# Patient Record
Sex: FEMALE | Race: BLACK OR AFRICAN AMERICAN | NOT HISPANIC OR LATINO | URBAN - METROPOLITAN AREA
[De-identification: names, ages, dates, MRNs, and addresses within clinical notes are randomized per-mention and may not be internally consistent; named-entity substitution may affect disease eponyms.]

---

## 2021-02-22 ENCOUNTER — OFFICE VISIT (OUTPATIENT)
Dept: URBAN - METROPOLITAN AREA CLINIC 96 | Facility: CLINIC | Age: 51
End: 2021-02-22

## 2021-07-09 ENCOUNTER — OFFICE VISIT (OUTPATIENT)
Dept: URBAN - METROPOLITAN AREA CLINIC 40 | Facility: CLINIC | Age: 51
End: 2021-07-09
Payer: COMMERCIAL

## 2021-07-09 ENCOUNTER — WEB ENCOUNTER (OUTPATIENT)
Dept: URBAN - METROPOLITAN AREA CLINIC 40 | Facility: CLINIC | Age: 51
End: 2021-07-09

## 2021-07-09 VITALS
SYSTOLIC BLOOD PRESSURE: 142 MMHG | TEMPERATURE: 95 F | BODY MASS INDEX: 24.66 KG/M2 | HEIGHT: 65 IN | WEIGHT: 148 LBS | DIASTOLIC BLOOD PRESSURE: 82 MMHG | HEART RATE: 90 BPM

## 2021-07-09 DIAGNOSIS — K29.70 GASTRITIS: ICD-10-CM

## 2021-07-09 DIAGNOSIS — R14.0 BLOATING: ICD-10-CM

## 2021-07-09 DIAGNOSIS — Z87.11 HISTORY OF PEPTIC ULCER DISEASE: ICD-10-CM

## 2021-07-09 DIAGNOSIS — M94.0 COSTOCHONDRITIS: ICD-10-CM

## 2021-07-09 DIAGNOSIS — K59.01 CONSTIPATION: ICD-10-CM

## 2021-07-09 DIAGNOSIS — R10.13 MIDEPIGASTRIC PAIN: ICD-10-CM

## 2021-07-09 PROCEDURE — 99214 OFFICE O/P EST MOD 30 MIN: CPT | Performed by: INTERNAL MEDICINE

## 2021-07-09 RX ORDER — SUCRALFATE 1 G/1
1 TABLET ON AN EMPTY STOMACH TABLET ORAL TWICE A DAY
Qty: 60 | Refills: 1 | OUTPATIENT
Start: 2021-07-09 | End: 2021-09-07

## 2021-07-09 RX ORDER — BISACODYL 5 MG/1
1 TABLET AS NEEDED TABLET, COATED ORAL ONCE A DAY
Status: DISCONTINUED | COMMUNITY

## 2021-07-09 RX ORDER — PRIMIDONE 250 MG/1
1 TABLET TABLET ORAL TWICE A DAY
Status: DISCONTINUED | COMMUNITY

## 2021-07-09 RX ORDER — LOSARTAN POTASSIUM 25 MG/1
TABLET, FILM COATED ORAL
Qty: 0 | Refills: 0 | Status: DISCONTINUED | COMMUNITY
Start: 1900-01-01

## 2021-07-09 RX ORDER — GALCANEZUMAB 120 MG/ML
AS DIRECTED INJECTION, SOLUTION SUBCUTANEOUS
Status: ACTIVE | COMMUNITY

## 2021-07-09 RX ORDER — VALACYCLOVIR 500 MG/1
1 TABLET TABLET, FILM COATED ORAL ONCE A DAY
Status: DISCONTINUED | COMMUNITY

## 2021-07-09 RX ORDER — PANTOPRAZOLE SODIUM 40 MG/1
1 TABLET TABLET, DELAYED RELEASE ORAL ONCE A DAY
Qty: 90 | Refills: 1 | OUTPATIENT
Start: 2021-07-09

## 2021-07-09 RX ORDER — LAMOTRIGINE 100 MG/1
1 TABLET TABLET ORAL ONCE A DAY
Status: ACTIVE | COMMUNITY

## 2021-07-09 NOTE — HPI-TODAY'S VISIT:
Ms. Graf is a 50 year old Black female who presents to [facility] office today with abdominal pain. She has been followed by Dr. Lema previously. She had an EGD by Dr. Lema in 2019 where she was noted to have gastritis, a small nonbleeding superficial gastric ulcer.  Normal duodenum.  She had a colonoscopy in February 21, 2018 with a small polyp removed from sigmoid colon.  There was some moderate inflammation and ulcerated mucosa in the cecum and ascending colon which was biopsied.  The remaining colon and terminal ileum were normal.  Per path, sigmoid colon polyp was hyperplastic.  Biopsies from his terminal ileum were normal.  Cecal biopsies with prominent lymphoid aggregates no evidence of chronic, active colitis.  No collagenous or lymphocytic colitis.  Ascending colon polyps with rare with reflux crypts but no evidence of active or chronic colitis.  Transverse colon biopsies were unremarkable as well as biopsies from the descending colon.  It was recommended that she have another colonoscopy in 5 years time.  She is coming to the office today for management of bloating, abdominal pain after steroids for costochondritis. Complains of midepigastric abdominal pain, nonradiating occasional right-sided discomfort.  Has had nausea as well as constipation.  Does not believe having a bowel movement completely alleviates the abdominal pain.  Also, she admits that she was recently diagnosed with costochondritis and given a trial of steroids as well as a brief trial of NSAIDs which she completed.  Onset of abdominal pain in the last 2 weeks.  She is a  and is concerned with travel early next week.  Denies other use of NSAIDs prior to onset of sudden epigastric pain.  Denies change in her diet until recently when she had been doing low carbohydrate diet.  No stressors otherwise identified.  Has had no significant alcohol prior to onset of event.  Not smoking.  Denies any other precipitating factors.  Not currently on any PPI therapy but admits she did take an OTC Nexium on yesterday.  Also used a suppository and tried to use an enema and got very small caliber stool without any rectal bleeding or melena reported.

## 2021-07-09 NOTE — PHYSICAL EXAM GASTROINTESTINAL
Abdomen , soft, mild TTP in MEGD region, nondistended , no guarding or rigidity , no masses palpable , normal bowel sounds , Liver and Spleen , no hepatomegaly present , no hepatosplenomegaly , liver nontender , spleen not palpable

## 2021-07-12 ENCOUNTER — OFFICE VISIT (OUTPATIENT)
Dept: URBAN - METROPOLITAN AREA SURGERY CENTER 30 | Facility: SURGERY CENTER | Age: 51
End: 2021-07-12

## 2021-07-12 PROBLEM — 4556007 GASTRITIS: Status: ACTIVE | Noted: 2021-07-09

## 2021-07-12 PROBLEM — 14760008 CONSTIPATION: Status: ACTIVE | Noted: 2021-07-12

## 2021-07-13 ENCOUNTER — OFFICE VISIT (OUTPATIENT)
Dept: URBAN - METROPOLITAN AREA CLINIC 92 | Facility: CLINIC | Age: 51
End: 2021-07-13

## 2021-08-04 ENCOUNTER — OFFICE VISIT (OUTPATIENT)
Dept: URBAN - METROPOLITAN AREA SURGERY CENTER 30 | Facility: SURGERY CENTER | Age: 51
End: 2021-08-04

## 2021-08-10 ENCOUNTER — OFFICE VISIT (OUTPATIENT)
Dept: URBAN - METROPOLITAN AREA CLINIC 40 | Facility: CLINIC | Age: 51
End: 2021-08-10

## 2021-10-06 ENCOUNTER — TELEPHONE ENCOUNTER (OUTPATIENT)
Dept: URBAN - METROPOLITAN AREA CLINIC 40 | Facility: CLINIC | Age: 51
End: 2021-10-06

## 2022-01-13 ENCOUNTER — OFFICE VISIT (OUTPATIENT)
Dept: URBAN - METROPOLITAN AREA CLINIC 109 | Facility: CLINIC | Age: 52
End: 2022-01-13

## 2022-01-14 ENCOUNTER — OFFICE VISIT (OUTPATIENT)
Dept: URBAN - METROPOLITAN AREA CLINIC 74 | Facility: CLINIC | Age: 52
End: 2022-01-14

## 2022-03-10 ENCOUNTER — OFFICE VISIT (OUTPATIENT)
Dept: URBAN - METROPOLITAN AREA CLINIC 92 | Facility: CLINIC | Age: 52
End: 2022-03-10

## 2022-03-31 ENCOUNTER — OFFICE VISIT (OUTPATIENT)
Dept: URBAN - METROPOLITAN AREA CLINIC 92 | Facility: CLINIC | Age: 52
End: 2022-03-31

## 2022-05-09 ENCOUNTER — OFFICE VISIT (OUTPATIENT)
Dept: URBAN - METROPOLITAN AREA CLINIC 124 | Facility: CLINIC | Age: 52
End: 2022-05-09

## 2023-03-02 ENCOUNTER — LAB OUTSIDE AN ENCOUNTER (OUTPATIENT)
Dept: URBAN - METROPOLITAN AREA CLINIC 92 | Facility: CLINIC | Age: 53
End: 2023-03-02

## 2023-03-02 ENCOUNTER — OFFICE VISIT (OUTPATIENT)
Dept: URBAN - METROPOLITAN AREA CLINIC 92 | Facility: CLINIC | Age: 53
End: 2023-03-02
Payer: COMMERCIAL

## 2023-03-02 ENCOUNTER — DASHBOARD ENCOUNTERS (OUTPATIENT)
Age: 53
End: 2023-03-02

## 2023-03-02 VITALS
SYSTOLIC BLOOD PRESSURE: 122 MMHG | WEIGHT: 145 LBS | HEART RATE: 80 BPM | HEIGHT: 65 IN | TEMPERATURE: 97.8 F | BODY MASS INDEX: 24.16 KG/M2 | DIASTOLIC BLOOD PRESSURE: 78 MMHG

## 2023-03-02 DIAGNOSIS — K59.04 CHRONIC IDIOPATHIC CONSTIPATION: ICD-10-CM

## 2023-03-02 DIAGNOSIS — Z87.11 HISTORY OF GASTRIC ULCER: ICD-10-CM

## 2023-03-02 DIAGNOSIS — Z86.010 PERSONAL HISTORY OF COLONIC POLYPS: ICD-10-CM

## 2023-03-02 DIAGNOSIS — G40.909 NONINTRACTABLE EPILEPSY WITHOUT STATUS EPILEPTICUS, UNSPECIFIED EPILEPSY TYPE: ICD-10-CM

## 2023-03-02 PROBLEM — 82934008: Status: ACTIVE | Noted: 2023-03-02

## 2023-03-02 PROBLEM — 422513000: Status: ACTIVE | Noted: 2023-03-02

## 2023-03-02 PROBLEM — 428283002: Status: ACTIVE | Noted: 2023-03-02

## 2023-03-02 PROCEDURE — 99204 OFFICE O/P NEW MOD 45 MIN: CPT | Performed by: INTERNAL MEDICINE

## 2023-03-02 RX ORDER — LAMOTRIGINE 100 MG/1
1 TABLET TABLET ORAL ONCE A DAY
Status: ACTIVE | COMMUNITY

## 2023-03-02 RX ORDER — PANTOPRAZOLE SODIUM 40 MG/1
1 TABLET TABLET, DELAYED RELEASE ORAL ONCE A DAY
Qty: 90 | Refills: 1 | Status: DISCONTINUED | COMMUNITY
Start: 2021-07-09

## 2023-03-02 RX ORDER — ONDANSETRON HYDROCHLORIDE 4 MG/1
1 TABLET AS NEEDED TABLET, FILM COATED ORAL ONCE A DAY
Qty: 2 | Refills: 0 | OUTPATIENT
Start: 2023-03-02

## 2023-03-02 RX ORDER — GALCANEZUMAB 120 MG/ML
AS DIRECTED INJECTION, SOLUTION SUBCUTANEOUS
Status: ACTIVE | COMMUNITY

## 2023-03-02 RX ORDER — LINACLOTIDE 145 UG/1
1 CAPSULE AT LEAST 30 MINUTES BEFORE THE FIRST MEAL OF THE DAY ON AN EMPTY STOMACH CAPSULE, GELATIN COATED ORAL ONCE A DAY
Qty: 90 | Refills: 3 | OUTPATIENT
Start: 2023-03-02 | End: 2024-02-25

## 2023-03-02 RX ORDER — POLYETHYLENE GLYCOL 3350, SODIUM CHLORIDE, SODIUM BICARBONATE, POTASSIUM CHLORIDE 420; 11.2; 5.72; 1.48 G/4L; G/4L; G/4L; G/4L
AS DIRECTED POWDER, FOR SOLUTION ORAL AS DIRECTED
Qty: 4 LITER | Refills: 0 | OUTPATIENT
Start: 2023-03-02 | End: 2023-03-03

## 2023-03-02 NOTE — HPI-TODAY'S VISIT:
This is a 52-year-old female referred for GI consultation and a copy will be sent to the referring provider Dr. Reinaldo Rocha.  I last saw the patient for abdominal pain and did an EGD on July 9, 2019 as she had complained of some black stools several weeks prior to seeing me.  She was taking some ibuprofen and had a history of erosive gastritis in 2018.  EGD showed 1 superficial gastric ulcer with no stigmata of bleeding in the gastric antrum.  There was some gastritis as well biopsies were sent.  Pathology showed no evidence of celiac, reactive gastropathy with intestinal metaplasia of the antrum but no H. pylori seen and gastric body biopsies were normal.  Prior to that she had an EGD in May 2018 for abdominal pain that showed some antral erosions and some gastritis.  Prior to that she had a colonoscopy done on February 1, 2018 for rectal bleeding as well as an abnormal CT scan of the GI tract that mentioned colitis.  This revealed internal hemorrhoids that were nonbleeding as well as a 3 mm sigmoid colon polyp that was removed.  There was some moderately inflamed and ulcerated tissue at the cecum and in the ascending colon which were biopsied the TI was normal.  Pathology revealed a hyperplastic sigmoid polyp.  TI and cecal biopsies were normal, ascending colon biopsy showed some rare withering crypts but no active colitis, transverse colon biopsy was normal descending colon biopsy was normal, sigmoid biopsy was normal and rectal biopsy was normal.  I had recommended a repeat colonoscopy in 5 years or February 2023.  Patient did see our PA Yolanda Wooten back in July 2021 for some abdominal pain and bloating and they did schedule an EGD but it does not appear that she did that EGD with Dr. Caputo. Pt here due to chr constipation. Pt was taking otc herbal with psyllium and cascara. Pt would not go for 4- 5 days and no BM. Pt says stools were hard. Pt is not on new meds or pain meds. Pt is in menopause. Pt had a seizure last year when pain meds did not work after foot surgery. Pt is a  for [employer]. I take care of her .

## 2023-03-16 ENCOUNTER — OFFICE VISIT (OUTPATIENT)
Dept: URBAN - METROPOLITAN AREA CLINIC 92 | Facility: CLINIC | Age: 53
End: 2023-03-16

## 2023-10-03 ENCOUNTER — OFFICE VISIT (OUTPATIENT)
Dept: URBAN - METROPOLITAN AREA SURGERY CENTER 16 | Facility: SURGERY CENTER | Age: 53
End: 2023-10-03

## 2024-01-25 ENCOUNTER — TELEPHONE ENCOUNTER (OUTPATIENT)
Dept: URBAN - METROPOLITAN AREA CLINIC 92 | Facility: CLINIC | Age: 54
End: 2024-01-25

## 2024-01-25 ENCOUNTER — OFFICE VISIT (OUTPATIENT)
Dept: URBAN - METROPOLITAN AREA SURGERY CENTER 16 | Facility: SURGERY CENTER | Age: 54
End: 2024-01-25

## 2024-02-26 ENCOUNTER — COLON (OUTPATIENT)
Dept: URBAN - METROPOLITAN AREA SURGERY CENTER 16 | Facility: SURGERY CENTER | Age: 54
End: 2024-02-26

## 2024-05-08 ENCOUNTER — WEB ENCOUNTER (OUTPATIENT)
Dept: URBAN - METROPOLITAN AREA CLINIC 92 | Facility: CLINIC | Age: 54
End: 2024-05-08

## 2024-05-09 ENCOUNTER — TELEPHONE ENCOUNTER (OUTPATIENT)
Dept: URBAN - METROPOLITAN AREA CLINIC 92 | Facility: CLINIC | Age: 54
End: 2024-05-09

## 2024-05-14 ENCOUNTER — OFFICE VISIT (OUTPATIENT)
Dept: URBAN - METROPOLITAN AREA SURGERY CENTER 16 | Facility: SURGERY CENTER | Age: 54
End: 2024-05-14

## 2024-06-04 ENCOUNTER — TELEPHONE ENCOUNTER (OUTPATIENT)
Dept: URBAN - METROPOLITAN AREA CLINIC 92 | Facility: CLINIC | Age: 54
End: 2024-06-04

## 2024-06-04 RX ORDER — TENAPANOR HYDROCHLORIDE 53.2 MG/1
1 TABLET IMMEDIATELY BEFORE MEALS TABLET ORAL
Qty: 90 TABLETS | Refills: 3 | OUTPATIENT
Start: 2024-06-05 | End: 2025-05-31

## 2024-06-05 ENCOUNTER — TELEPHONE ENCOUNTER (OUTPATIENT)
Dept: URBAN - METROPOLITAN AREA CLINIC 96 | Facility: CLINIC | Age: 54
End: 2024-06-05

## 2024-06-06 ENCOUNTER — TELEPHONE ENCOUNTER (OUTPATIENT)
Dept: URBAN - METROPOLITAN AREA CLINIC 96 | Facility: CLINIC | Age: 54
End: 2024-06-06

## 2024-06-06 ENCOUNTER — TELEPHONE ENCOUNTER (OUTPATIENT)
Dept: URBAN - METROPOLITAN AREA CLINIC 92 | Facility: CLINIC | Age: 54
End: 2024-06-06

## 2024-07-01 ENCOUNTER — OFFICE VISIT (OUTPATIENT)
Dept: URBAN - METROPOLITAN AREA TELEHEALTH 2 | Facility: TELEHEALTH | Age: 54
End: 2024-07-01
Payer: COMMERCIAL

## 2024-07-01 ENCOUNTER — TELEPHONE ENCOUNTER (OUTPATIENT)
Dept: URBAN - METROPOLITAN AREA CLINIC 92 | Facility: CLINIC | Age: 54
End: 2024-07-01

## 2024-07-01 VITALS — HEIGHT: 65 IN | BODY MASS INDEX: 24.16 KG/M2 | WEIGHT: 145 LBS

## 2024-07-01 DIAGNOSIS — K59.04 CHRONIC IDIOPATHIC CONSTIPATION: ICD-10-CM

## 2024-07-01 DIAGNOSIS — Z86.010 PERSONAL HISTORY OF COLONIC POLYPS: ICD-10-CM

## 2024-07-01 DIAGNOSIS — G40.909 NONINTRACTABLE EPILEPSY WITHOUT STATUS EPILEPTICUS, UNSPECIFIED EPILEPSY TYPE: ICD-10-CM

## 2024-07-01 PROCEDURE — 99213 OFFICE O/P EST LOW 20 MIN: CPT

## 2024-07-01 RX ORDER — GALCANEZUMAB 120 MG/ML
AS DIRECTED INJECTION, SOLUTION SUBCUTANEOUS
Status: ACTIVE | COMMUNITY

## 2024-07-01 RX ORDER — LAMOTRIGINE 100 MG/1
1 TABLET TABLET ORAL ONCE A DAY
Status: ACTIVE | COMMUNITY

## 2024-07-01 RX ORDER — TENAPANOR HYDROCHLORIDE 53.2 MG/1
1 TABLET IMMEDIATELY BEFORE MEALS TABLET ORAL
Qty: 90 TABLETS | Refills: 3 | Status: ACTIVE | COMMUNITY
Start: 2024-06-05 | End: 2025-05-31

## 2024-07-01 RX ORDER — ONDANSETRON HYDROCHLORIDE 4 MG/1
1 TABLET AS NEEDED TABLET, FILM COATED ORAL ONCE A DAY
Qty: 2 | Refills: 0 | Status: ACTIVE | COMMUNITY
Start: 2023-03-02

## 2024-07-01 NOTE — HPI-TODAY'S VISIT:
53-year-old female patient presents today for a follow-up visit regarding constipation.  She last saw Dr. Lema March 2023.  At that time she was complaining of constipation and has been taking herbal supplements.  She was having a bowel movement for 4 to 5 days.  She was given Linzess 145 after this visit. Her last colonoscopy was 2-2018 this demonstrated hemorrhoids, 3 mm hyperplastic polyp, inflamed and ulcerated mucosa in the cecum and ascending colon.  Biopsies demonstrated prominent lymphoid aggregates in the cecum and ascending colon with a rare wavering crypts no evidence of active or chronic spiculated medication or early ischemic injury Patient was told to have a colonoscopy after last visit.  Neurology clearance is in chart from February 2024  She sent a message in May indicating that Linzess was not working for her constipation.  She was sent Ibsrela.  Today she notes she has been on Ibsrela for 2 weeks but still has constipation. She is a  and admits to missing her second dose at times. She was place on Ozempic for weight loss a few months ago. She has been off of this and notes she had the same level of constipation while on the Ozempic and this did not worsen her sx. She has around 1-2 BM Daily. No abd pain, melena or brbpr.

## 2024-07-08 ENCOUNTER — OFFICE VISIT (OUTPATIENT)
Dept: URBAN - METROPOLITAN AREA SURGERY CENTER 16 | Facility: SURGERY CENTER | Age: 54
End: 2024-07-08

## 2024-07-23 ENCOUNTER — OFFICE VISIT (OUTPATIENT)
Dept: URBAN - METROPOLITAN AREA TELEHEALTH 2 | Facility: TELEHEALTH | Age: 54
End: 2024-07-23
Payer: COMMERCIAL

## 2024-07-23 ENCOUNTER — TELEPHONE ENCOUNTER (OUTPATIENT)
Dept: URBAN - METROPOLITAN AREA CLINIC 92 | Facility: CLINIC | Age: 54
End: 2024-07-23

## 2024-07-23 VITALS — WEIGHT: 119 LBS | HEIGHT: 65 IN | BODY MASS INDEX: 19.83 KG/M2

## 2024-07-23 DIAGNOSIS — K59.09 CHANGE IN BOWEL MOVEMENTS INTERMITTENT CONSTIPATION. URGENCY IN THE MORNING.: ICD-10-CM

## 2024-07-23 DIAGNOSIS — R11.0 NAUSEA: ICD-10-CM

## 2024-07-23 DIAGNOSIS — Z86.010 PERSONAL HISTORY OF COLONIC POLYPS: ICD-10-CM

## 2024-07-23 DIAGNOSIS — R10.13 EPIGASTRIC PAIN: ICD-10-CM

## 2024-07-23 PROCEDURE — 99214 OFFICE O/P EST MOD 30 MIN: CPT

## 2024-07-23 RX ORDER — ONDANSETRON HYDROCHLORIDE 4 MG/1
1 TABLET AS NEEDED TABLET, FILM COATED ORAL ONCE A DAY
Qty: 2 | Refills: 0 | COMMUNITY
Start: 2023-03-02

## 2024-07-23 RX ORDER — TENAPANOR HYDROCHLORIDE 53.2 MG/1
1 TABLET IMMEDIATELY BEFORE MEALS TABLET ORAL
Qty: 90 TABLETS | Refills: 3 | COMMUNITY
Start: 2024-06-05 | End: 2025-05-31

## 2024-07-23 RX ORDER — LAMOTRIGINE 100 MG/1
1 TABLET TABLET ORAL ONCE A DAY
COMMUNITY

## 2024-07-23 RX ORDER — OMEPRAZOLE 40 MG/1
1 CAPSULE 30 MINUTES BEFORE MORNING MEAL CAPSULE, DELAYED RELEASE ORAL ONCE A DAY
Qty: 90 | Refills: 3 | OUTPATIENT
Start: 2024-07-23

## 2024-07-23 RX ORDER — GALCANEZUMAB 120 MG/ML
AS DIRECTED INJECTION, SOLUTION SUBCUTANEOUS
COMMUNITY

## 2024-07-23 NOTE — HPI-TODAY'S VISIT:
53-year-old female patient presents today for a follow-up visit regarding constipation.  She last saw Dr. Lema March 2023.  At that time she was complaining of constipation and has been taking herbal supplements.  She was having a bowel movement for 4 to 5 days.  She was given Linzess 145 after this visit. Her last colonoscopy was 2-2018 this demonstrated hemorrhoids, 3 mm hyperplastic polyp, inflamed and ulcerated mucosa in the cecum and ascending colon.  Biopsies demonstrated prominent lymphoid aggregates in the cecum and ascending colon with a rare wavering crypts no evidence of active or chronic spiculated medication or early ischemic injury Patient was told to have a colonoscopy after last visit.  Neurology clearance is in chart from February 2024  She sent a message in May indicating that Linzess was not working for her constipation.  She was sent Ibsrela.  Today she notes she has been on Ibsrela for a few weeks BM are better. Was going 1x a week now goes 2-3x a week. She has not tried to add on MiraLAX or magnesium. She was place on Ozempic for weight loss a few months ago. She has been off of this and notes she had the same level of constipation while on the Ozempic and this did not worsen her sx.  Since last visit she has been noting increased epigastric pain that radiates to her lower abdomen. This is worse after food. She has h/o gastric ulcer in 2019. She is a  so travels freq. Rare nsaids. No vomiting. Has been having some darker stool but not black. No BRBPR.

## 2024-08-30 ENCOUNTER — OFFICE VISIT (OUTPATIENT)
Dept: URBAN - METROPOLITAN AREA SURGERY CENTER 18 | Facility: SURGERY CENTER | Age: 54
End: 2024-08-30

## 2024-08-30 RX ORDER — OMEPRAZOLE 40 MG/1
1 CAPSULE 30 MINUTES BEFORE MORNING MEAL CAPSULE, DELAYED RELEASE ORAL ONCE A DAY
Qty: 90 | Refills: 3 | Status: ACTIVE | COMMUNITY
Start: 2024-07-23

## 2024-08-30 RX ORDER — TENAPANOR HYDROCHLORIDE 53.2 MG/1
1 TABLET IMMEDIATELY BEFORE MEALS TABLET ORAL
Qty: 90 TABLETS | Refills: 3 | Status: ACTIVE | COMMUNITY
Start: 2024-06-05 | End: 2025-05-31

## 2024-08-30 RX ORDER — ONDANSETRON HYDROCHLORIDE 4 MG/1
1 TABLET AS NEEDED TABLET, FILM COATED ORAL ONCE A DAY
Qty: 2 | Refills: 0 | Status: ACTIVE | COMMUNITY
Start: 2023-03-02

## 2024-08-30 RX ORDER — LAMOTRIGINE 100 MG/1
1 TABLET TABLET ORAL ONCE A DAY
Status: ACTIVE | COMMUNITY

## 2024-08-30 RX ORDER — GALCANEZUMAB 120 MG/ML
AS DIRECTED INJECTION, SOLUTION SUBCUTANEOUS
Status: ACTIVE | COMMUNITY

## 2024-09-17 ENCOUNTER — OFFICE VISIT (OUTPATIENT)
Dept: URBAN - METROPOLITAN AREA TELEHEALTH 2 | Facility: TELEHEALTH | Age: 54
End: 2024-09-17

## 2024-09-17 VITALS — WEIGHT: 120 LBS | BODY MASS INDEX: 19.99 KG/M2 | HEIGHT: 65 IN

## 2024-09-17 RX ORDER — LAMOTRIGINE 100 MG/1
1 TABLET TABLET ORAL ONCE A DAY
Status: ACTIVE | COMMUNITY

## 2024-09-17 RX ORDER — TENAPANOR HYDROCHLORIDE 53.2 MG/1
1 TABLET IMMEDIATELY BEFORE MEALS TABLET ORAL
Qty: 90 TABLETS | Refills: 3 | Status: ACTIVE | COMMUNITY
Start: 2024-06-05 | End: 2025-05-31

## 2024-09-17 RX ORDER — GALCANEZUMAB 120 MG/ML
AS DIRECTED INJECTION, SOLUTION SUBCUTANEOUS
Status: ACTIVE | COMMUNITY

## 2024-10-01 ENCOUNTER — OFFICE VISIT (OUTPATIENT)
Dept: URBAN - METROPOLITAN AREA TELEHEALTH 2 | Facility: TELEHEALTH | Age: 54
End: 2024-10-01

## 2024-10-01 RX ORDER — GALCANEZUMAB 120 MG/ML
AS DIRECTED INJECTION, SOLUTION SUBCUTANEOUS
COMMUNITY

## 2024-10-01 RX ORDER — TENAPANOR HYDROCHLORIDE 53.2 MG/1
1 TABLET IMMEDIATELY BEFORE MEALS TABLET ORAL
Qty: 90 TABLETS | Refills: 3 | COMMUNITY
Start: 2024-06-05 | End: 2025-05-31

## 2024-10-01 RX ORDER — OMEPRAZOLE 40 MG/1
1 CAPSULE 30 MINUTES BEFORE MORNING MEAL CAPSULE, DELAYED RELEASE ORAL ONCE A DAY
Qty: 90 | Refills: 3 | COMMUNITY
Start: 2024-07-23

## 2024-10-01 RX ORDER — OMEPRAZOLE 40 MG/1
1 CAPSULE 30 MINUTES BEFORE MORNING MEAL CAPSULE, DELAYED RELEASE ORAL ONCE A DAY
Qty: 90 | Refills: 3 | OUTPATIENT

## 2024-10-01 RX ORDER — LAMOTRIGINE 100 MG/1
1 TABLET TABLET ORAL ONCE A DAY
COMMUNITY

## 2024-10-01 NOTE — HPI-TODAY'S VISIT:
53-year-old female patient presents today for a follow-up visit regarding chronic constipation and abd pain. In 2023 she was complaining of constipation and has been taking herbal supplements.  She was having a bowel movement for 4 to 5 days.  She was given Linzess 145 after this visit. Her last colonoscopy was 2-2018 this demonstrated hemorrhoids, 3 mm hyperplastic polyp, inflamed and ulcerated mucosa in the cecum and ascending colon.  Biopsies demonstrated prominent lymphoid aggregates in the cecum and ascending colon with a rare wavering crypts no evidence of active or chronic spiculated medication or early ischemic injury. Patient was told to have a colonoscopy after last visit.  Neurology clearance is in chart from February 2024  She sent a message in May indicating that Linzess was not working for her constipation.  She was sent Ibsrela.  Pt was seen by our PA in July 2024 and noted she has been on Ibsrela for a few weeks BM were better. Was going 1x a week now goes 2-3x a week. She has not tried to add on MiraLAX or magnesium. She was place on Ozempic for weight loss a few months ago. She has been off of this and notes she had the same level of constipation while on the Ozempic and this did not worsen her sx. Pt also c/o increased epigastric pain that radiates to her lower abdomen. This is worse after food. She has h/o gastric ulcer in 2019. She is a  so travels freq. Rare nsaids. No vomiting. Has been having some darker stool but not black. No BRBPR.  Patient had an EGD done on August 30, 2024 to evaluate nausea and abdominal pain.  This revealed some erythema of the stomach with some tiny erosions.  Biopsies were sent.  Pathology showed no evidence of celiac, reactive gastropathy of the stomach but no H. pylori, stomach body biopsy was normal and esophageal biopsy showed reflux. Pt was to ensure ibsrela working and needs to set up a surveillance colon with constip prep and off ozempic. Last colon in 2/2018.

## 2024-11-05 ENCOUNTER — OFFICE VISIT (OUTPATIENT)
Dept: URBAN - METROPOLITAN AREA TELEHEALTH 2 | Facility: TELEHEALTH | Age: 54
End: 2024-11-05